# Patient Record
Sex: FEMALE | Race: WHITE | NOT HISPANIC OR LATINO | URBAN - METROPOLITAN AREA
[De-identification: names, ages, dates, MRNs, and addresses within clinical notes are randomized per-mention and may not be internally consistent; named-entity substitution may affect disease eponyms.]

---

## 2021-08-16 ENCOUNTER — OFFICE VISIT (OUTPATIENT)
Dept: URGENT CARE | Facility: CLINIC | Age: 51
End: 2021-08-16

## 2021-08-16 VITALS
SYSTOLIC BLOOD PRESSURE: 140 MMHG | HEART RATE: 76 BPM | TEMPERATURE: 98.2 F | HEIGHT: 63 IN | RESPIRATION RATE: 16 BRPM | WEIGHT: 165 LBS | OXYGEN SATURATION: 99 % | DIASTOLIC BLOOD PRESSURE: 80 MMHG | BODY MASS INDEX: 29.23 KG/M2

## 2021-08-16 DIAGNOSIS — R53.81 MALAISE AND FATIGUE: ICD-10-CM

## 2021-08-16 DIAGNOSIS — R53.83 MALAISE AND FATIGUE: ICD-10-CM

## 2021-08-16 DIAGNOSIS — M54.50 ACUTE BILATERAL LOW BACK PAIN WITHOUT SCIATICA: ICD-10-CM

## 2021-08-16 DIAGNOSIS — R30.0 DYSURIA: ICD-10-CM

## 2021-08-16 PROBLEM — R94.31 ABNORMAL EKG: Status: ACTIVE | Noted: 2017-07-13

## 2021-08-16 PROBLEM — R07.2 PRECORDIAL PAIN: Status: ACTIVE | Noted: 2017-07-13

## 2021-08-16 PROBLEM — R00.0 TACHYCARDIA: Status: ACTIVE | Noted: 2017-07-13

## 2021-08-16 LAB
APPEARANCE UR: CLEAR
BILIRUB UR STRIP-MCNC: NORMAL MG/DL
COLOR UR AUTO: YELLOW
GLUCOSE UR STRIP.AUTO-MCNC: NORMAL MG/DL
KETONES UR STRIP.AUTO-MCNC: NORMAL MG/DL
LEUKOCYTE ESTERASE UR QL STRIP.AUTO: NORMAL
NITRITE UR QL STRIP.AUTO: NORMAL
PH UR STRIP.AUTO: 5.5 [PH] (ref 5–8)
PROT UR QL STRIP: NORMAL MG/DL
RBC UR QL AUTO: NORMAL
SP GR UR STRIP.AUTO: <=1.005
UROBILINOGEN UR STRIP-MCNC: 0.2 MG/DL

## 2021-08-16 PROCEDURE — 81002 URINALYSIS NONAUTO W/O SCOPE: CPT | Performed by: PHYSICIAN ASSISTANT

## 2021-08-16 PROCEDURE — 99203 OFFICE O/P NEW LOW 30 MIN: CPT | Performed by: PHYSICIAN ASSISTANT

## 2021-08-16 RX ORDER — BIOTIN 10000 MCG
CAPSULE ORAL
COMMUNITY
End: 2021-08-16

## 2021-08-16 RX ORDER — BUPROPION HYDROCHLORIDE 100 MG/1
100 TABLET ORAL
COMMUNITY
End: 2021-08-16

## 2021-08-16 RX ORDER — CEFDINIR 300 MG/1
300 CAPSULE ORAL 2 TIMES DAILY
Qty: 5 CAPSULE | Refills: 0 | Status: SHIPPED | OUTPATIENT
Start: 2021-08-16

## 2021-08-16 ASSESSMENT — ENCOUNTER SYMPTOMS
FEVER: 1
FLANK PAIN: 0
MYALGIAS: 1
ABDOMINAL PAIN: 0
VOMITING: 0
NAUSEA: 0

## 2021-08-16 NOTE — PROGRESS NOTES
"Subjective:   Elena Malcolm is a 51 y.o. female who presents for UTI (back pain , pressure)        Patient presents with complaints of low back pain and bladder fullness for the last 5 days.    Pain in the low back is central and radiates out.    She feels that she is urinating very little when she goes to the bathroom.  She endorses some mild urinary frequency and urinary urgency.  States that she has had similar symptoms with prior UTIs.    She denies vaginal pain and abnormal vaginal discharge.  States the first couple days she had some subjective low-grade fevers that have since resolved.  No nausea or vomiting.  No abdominal pain.  She endorses increased fatigue.  States that she had an itchy rash on her upper back yesterday as well.  No known Covid exposure.  Received Covid vaccination 3 weeks ago.  Patient has been trying to drink plenty of water to flush out potential infection.    Review of Systems   Constitutional: Positive for fever (Resolved).   Gastrointestinal: Negative for abdominal pain, nausea and vomiting.   Genitourinary: Positive for dysuria, frequency and urgency. Negative for flank pain and hematuria.   Musculoskeletal: Positive for myalgias.   Skin: Positive for rash.       PMH:  has no past medical history on file.  MEDS:   Current Outpatient Medications:   •  cefdinir (OMNICEF) 300 MG Cap, Take 1 Capsule by mouth 2 times a day., Disp: 5 Capsule, Rfl: 0  ALLERGIES: No Known Allergies  SURGHX: History reviewed. No pertinent surgical history.  SOCHX:    FH: Family history was reviewed, no pertinent findings to report   Objective:   /80   Pulse 76   Temp 36.8 °C (98.2 °F) (Temporal)   Resp 16   Ht 1.6 m (5' 3\")   Wt 74.8 kg (165 lb)   SpO2 99%   BMI 29.23 kg/m²   Physical Exam  Vitals reviewed.   Constitutional:       General: She is not in acute distress.     Appearance: Normal appearance. She is well-developed. She is not toxic-appearing.   HENT:      Head: Normocephalic and " atraumatic.      Right Ear: External ear normal.      Left Ear: External ear normal.      Nose: Nose normal.   Eyes:      General: Gaze aligned appropriately.   Cardiovascular:      Rate and Rhythm: Normal rate and regular rhythm.   Pulmonary:      Effort: Pulmonary effort is normal. No respiratory distress.      Breath sounds: No stridor.   Abdominal:      General: Abdomen is flat.      Palpations: Abdomen is soft.      Tenderness: There is no abdominal tenderness. There is no right CVA tenderness, left CVA tenderness, guarding or rebound.   Musculoskeletal:      Cervical back: Neck supple.      Lumbar back: Normal. No bony tenderness. Normal range of motion.   Skin:     General: Skin is warm and dry.      Capillary Refill: Capillary refill takes less than 2 seconds.      Comments: No visible rashes on upper buttocks, upper and lower back, abdomen, chest, arms or legs.   Neurological:      Mental Status: She is alert and oriented to person, place, and time.      Comments: CN2-12 grossly intact   Psychiatric:         Speech: Speech normal.         Behavior: Behavior normal.           Results for orders placed or performed in visit on 08/16/21   POCT Urinalysis   Result Value Ref Range    POC Color yellow Negative    POC Appearance clear Negative    POC Leukocyte Esterase Neg Negative    POC Nitrites Neg Negative    POC Urobiligen 0.2 Negative (0.2) mg/dL    POC Protein Neg Negative mg/dL    POC Urine PH 5.5 5.0 - 8.0    POC Blood Neg Negative    POC Specific Gravity <=1.005 <1.005 - >1.030    POC Ketones Neg Negative mg/dL    POC Bilirubin Neg Negative mg/dL    POC Glucose Neg Negative mg/dL       Assessment/Plan:   1. Dysuria  - POCT Urinalysis  - cefdinir (OMNICEF) 300 MG Cap; Take 1 Capsule by mouth 2 times a day.  Dispense: 5 Capsule; Refill: 0    2. Malaise and fatigue    3. Acute bilateral low back pain without sciatica    UA unremarkable aside from low specific gravity.  Patient has been drinking copious  amounts of fluid out of concern for urinary tract infection.  She is not currently have CVA tenderness, fevers, chills.  I am not convinced that this is a urinary tract infection, however patient feels that current symptoms are similar to those that she is experienced in the past with urinary tract infections.    Patient is a  nearly passing through [facility].  She declines urine culture due to cost concerns.  We will treat empirically with an antibiotic and strict follow-up precautions. Patient advised that symptoms may also be secondary to process that has not yet fully manifested.  If symptoms persist or worsen I would like her to be reevaluated immediately.    Differential diagnosis, natural history, supportive care, and indications for immediate follow-up discussed.